# Patient Record
Sex: FEMALE | Race: WHITE | NOT HISPANIC OR LATINO
[De-identification: names, ages, dates, MRNs, and addresses within clinical notes are randomized per-mention and may not be internally consistent; named-entity substitution may affect disease eponyms.]

---

## 2023-11-07 ENCOUNTER — APPOINTMENT (OUTPATIENT)
Dept: ORTHOPEDIC SURGERY | Facility: CLINIC | Age: 78
End: 2023-11-07
Payer: MEDICARE

## 2023-11-07 PROCEDURE — 99213 OFFICE O/P EST LOW 20 MIN: CPT

## 2024-05-21 ENCOUNTER — APPOINTMENT (OUTPATIENT)
Dept: ORTHOPEDIC SURGERY | Facility: CLINIC | Age: 79
End: 2024-05-21
Payer: MEDICARE

## 2024-05-21 PROCEDURE — 20610 DRAIN/INJ JOINT/BURSA W/O US: CPT | Mod: RT

## 2024-05-21 PROCEDURE — 99214 OFFICE O/P EST MOD 30 MIN: CPT | Mod: 25

## 2024-05-21 PROCEDURE — 73564 X-RAY EXAM KNEE 4 OR MORE: CPT | Mod: RT

## 2024-05-21 NOTE — DISCUSSION/SUMMARY
[de-identified] : Lengthy discussion regarding options was had with the patient. Nonsurgical options including but not limited to cortisone, Visco supplementation, Prescription anti-inflammatory medications (both steroidal and non-steroidal), activity modification, non-impact exercise, maintaining a healthy BMI, bracing, and icing were reviewed. Surgical options including but not limited to arthroscopy, and joint replacement were discussed as was risks, benefits and alternatives. The patient was advised of the diagnosis. The natural history of the pathology was explained in full to the patient in layman's terms. Several different treatment options were discussed and explained in full to the patient including the risks and benefits of both surgical and non-surgical treatments. All questions and concerns were answered  Plan at this time is to begin PT and start the gel injection series. f/u in 1 week

## 2024-05-21 NOTE — PHYSICAL EXAM
[NL (0)] : extension 0 degrees [] : patient ambulates without assistive device [Right] : right knee [All Views] : anteroposterior, lateral, skyline, and anteroposterior standing [FreeTextEntry8] : ALEXIAL>GUCCIL [de-identified] : patella tracks laterally [FreeTextEntry9] : Lateral joint space narrowing. Sclerosis of the lateral knee. Valgus deformity. Severe DJD lateral compartment. Tricompartmental osteophyte formation. No fractures seen. [TWNoteComboBox7] : flexion 120 degrees

## 2024-05-21 NOTE — PROCEDURE
[Large Joint Injection] : Large joint injection [Right] : of the right [Knee] : knee [Pain] : pain [Inflammation] : inflammation [X-ray evidence of Osteoarthritis on this or prior visit] : x-ray evidence of Osteoarthritis on this or prior visit [Repeat series performed] : repeat series performed [Betadine] : betadine [Ethyl Chloride sprayed topically] : ethyl chloride sprayed topically [Sterile technique used] : sterile technique used [Gel-Syn (16.8mg)] : 16.8mg of Gel-Syn [#1] : series #1 [] : Patient tolerated procedure well [Call if redness, pain or fever occur] : call if redness, pain or fever occur [Apply ice for 15min out of every hour for the next 12-24 hours as tolerated] : apply ice for 15 minutes out of every hour for the next 12-24 hours as tolerated [Previous OTC use and PT nontherapeutic] : patient has tried OTC's including aspirin, Ibuprofen, Aleve, etc or prescription NSAIDS, and/or exercises at home and/or physical therapy without satisfactory response [Patient had decreased mobility in the joint] : patient had decreased mobility in the joint [Risks, benefits, alternatives discussed / Verbal consent obtained] : the risks benefits, and alternatives have been discussed, and verbal consent was obtained

## 2024-05-21 NOTE — REASON FOR VISIT
[FreeTextEntry2] : here for right knee pain x a few weeks. last visit in 11/2023 was feeling better.  c/o lateral sided pain and pain below knee cap anterior.  patient is doing vishnu classes. no pain meds.  pain is twinges of pain 4/10.  waking her at night.

## 2024-05-31 ENCOUNTER — APPOINTMENT (OUTPATIENT)
Dept: ORTHOPEDIC SURGERY | Facility: CLINIC | Age: 79
End: 2024-05-31
Payer: MEDICARE

## 2024-05-31 PROCEDURE — 20610 DRAIN/INJ JOINT/BURSA W/O US: CPT | Mod: RT

## 2024-05-31 NOTE — PHYSICAL EXAM
[NL (0)] : extension 0 degrees [] : patient ambulates without assistive device [Right] : right knee [All Views] : anteroposterior, lateral, skyline, and anteroposterior standing [FreeTextEntry8] : ALEXIAL>GUCCIL [de-identified] : patella tracks laterally [FreeTextEntry9] : Lateral joint space narrowing. Sclerosis of the lateral knee. Valgus deformity. Severe DJD lateral compartment. Tricompartmental osteophyte formation. No fractures seen. [TWNoteComboBox7] : flexion 120 degrees

## 2024-06-07 ENCOUNTER — APPOINTMENT (OUTPATIENT)
Dept: ORTHOPEDIC SURGERY | Facility: CLINIC | Age: 79
End: 2024-06-07
Payer: MEDICARE

## 2024-06-07 DIAGNOSIS — M17.11 UNILATERAL PRIMARY OSTEOARTHRITIS, RIGHT KNEE: ICD-10-CM

## 2024-06-07 PROCEDURE — 20610 DRAIN/INJ JOINT/BURSA W/O US: CPT | Mod: RT

## 2024-06-07 NOTE — PHYSICAL EXAM
[NL (0)] : extension 0 degrees [] : patient ambulates without assistive device [Right] : right knee [All Views] : anteroposterior, lateral, skyline, and anteroposterior standing [FreeTextEntry8] : ALEXIAL>GUCCIL [de-identified] : patella tracks laterally [FreeTextEntry9] : Lateral joint space narrowing. Sclerosis of the lateral knee. Valgus deformity. Severe DJD lateral compartment. Tricompartmental osteophyte formation. No fractures seen. [TWNoteComboBox7] : flexion 120 degrees

## 2024-08-12 ENCOUNTER — OFFICE (OUTPATIENT)
Dept: URBAN - METROPOLITAN AREA CLINIC 104 | Facility: CLINIC | Age: 79
Setting detail: OPHTHALMOLOGY
End: 2024-08-12
Payer: MEDICARE

## 2024-08-12 ENCOUNTER — RX ONLY (RX ONLY)
Age: 79
End: 2024-08-12

## 2024-08-12 DIAGNOSIS — G43.809: ICD-10-CM

## 2024-08-12 DIAGNOSIS — H25.13: ICD-10-CM

## 2024-08-12 DIAGNOSIS — H43.811: ICD-10-CM

## 2024-08-12 DIAGNOSIS — H52.4: ICD-10-CM

## 2024-08-12 DIAGNOSIS — H11.153: ICD-10-CM

## 2024-08-12 PROCEDURE — 92015 DETERMINE REFRACTIVE STATE: CPT | Performed by: OPHTHALMOLOGY

## 2024-08-12 PROCEDURE — 92004 COMPRE OPH EXAM NEW PT 1/>: CPT | Performed by: OPHTHALMOLOGY

## 2024-08-12 ASSESSMENT — CONFRONTATIONAL VISUAL FIELD TEST (CVF)
OS_FINDINGS: FULL
OD_FINDINGS: FULL

## 2024-10-03 ENCOUNTER — OFFICE (OUTPATIENT)
Dept: URBAN - METROPOLITAN AREA CLINIC 105 | Facility: CLINIC | Age: 79
Setting detail: OPHTHALMOLOGY
End: 2024-10-03
Payer: MEDICARE

## 2024-10-03 DIAGNOSIS — H25.13: ICD-10-CM

## 2024-10-03 DIAGNOSIS — H11.153: ICD-10-CM

## 2024-10-03 DIAGNOSIS — H00.11: ICD-10-CM

## 2024-10-03 PROCEDURE — 92012 INTRM OPH EXAM EST PATIENT: CPT | Performed by: OPHTHALMOLOGY

## 2024-10-03 ASSESSMENT — REFRACTION_CURRENTRX
OD_AXIS: 108
OD_SPHERE: -1.25
OD_AXIS: 116
OS_AXIS: 049
OS_SPHERE: -1.50
OD_SPHERE: -1.25
OS_SPHERE: -1.50
OS_AXIS: 044
OS_OVR_VA: 20/
OS_OVR_VA: 20/
OD_OVR_VA: 20/
OS_CYLINDER: -1.00
OS_CYLINDER: -1.00
OD_CYLINDER: -1.75
OD_OVR_VA: 20/
OD_CYLINDER: -1.75

## 2024-10-03 ASSESSMENT — REFRACTION_MANIFEST
OS_CYLINDER: -1.25
OS_ADD: +2.50
OD_SPHERE: -1.25
OD_VA1: 20/25
OS_AXIS: 050
OD_ADD: +2.50
OD_CYLINDER: -1.75
OS_SPHERE: -3.25
OS_VA1: 20/30
OD_AXIS: 110

## 2024-10-03 ASSESSMENT — REFRACTION_AUTOREFRACTION
OS_AXIS: 048
OS_SPHERE: -2.00
OS_CYLINDER: -2.00
OD_CYLINDER: -1.75
OD_SPHERE: -1.50
OD_AXIS: 126

## 2024-10-03 ASSESSMENT — VISUAL ACUITY
OD_BCVA: 20/30-2
OS_BCVA: 20/25-2

## 2024-10-03 ASSESSMENT — TONOMETRY
OS_IOP_MMHG: 16
OD_IOP_MMHG: 13

## 2024-10-03 ASSESSMENT — DECREASING TEAR LAKE - SEVERITY SCORE
OD_DEC_TEARLAKE: 1+
OS_DEC_TEARLAKE: 1+

## 2024-10-03 ASSESSMENT — CONFRONTATIONAL VISUAL FIELD TEST (CVF)
OS_FINDINGS: FULL
OD_FINDINGS: FULL

## 2024-10-03 ASSESSMENT — KERATOMETRY
OS_K1POWER_DIOPTERS: 41.87
OS_K2POWER_DIOPTERS: 43.16
OD_K1POWER_DIOPTERS: 41.51
OD_AXISANGLE_DEGREES: 033
OS_AXISANGLE_DEGREES: 131
OD_K2POWER_DIOPTERS: 43.21

## 2025-02-03 ENCOUNTER — OFFICE (OUTPATIENT)
Dept: URBAN - METROPOLITAN AREA CLINIC 105 | Facility: CLINIC | Age: 80
Setting detail: OPHTHALMOLOGY
End: 2025-02-03
Payer: MEDICARE

## 2025-02-03 DIAGNOSIS — H11.153: ICD-10-CM

## 2025-02-03 DIAGNOSIS — H25.042: ICD-10-CM

## 2025-02-03 DIAGNOSIS — H25.013: ICD-10-CM

## 2025-02-03 DIAGNOSIS — H35.033: ICD-10-CM

## 2025-02-03 DIAGNOSIS — H25.13: ICD-10-CM

## 2025-02-03 DIAGNOSIS — H43.811: ICD-10-CM

## 2025-02-03 PROCEDURE — 92250 FUNDUS PHOTOGRAPHY W/I&R: CPT | Performed by: OPHTHALMOLOGY

## 2025-02-03 PROCEDURE — 92014 COMPRE OPH EXAM EST PT 1/>: CPT | Performed by: OPHTHALMOLOGY

## 2025-02-03 ASSESSMENT — REFRACTION_MANIFEST
OD_SPHERE: -1.25
OD_VA1: 20/25
OD_AXIS: 110
OS_CYLINDER: -1.25
OS_VA1: 20/30
OS_AXIS: 050
OS_ADD: +2.50
OS_SPHERE: -3.25
OD_CYLINDER: -1.75
OD_ADD: +2.50

## 2025-02-03 ASSESSMENT — REFRACTION_CURRENTRX
OD_OVR_VA: 20/
OD_SPHERE: -1.50
OS_OVR_VA: 20/
OS_SPHERE: -1.50
OD_OVR_VA: 20/
OD_CYLINDER: -1.75
OS_AXIS: 044
OS_AXIS: 044
OD_AXIS: 123
OS_SPHERE: -3.50
OD_CYLINDER: -1.75
OD_AXIS: 108
OS_OVR_VA: 20/
OS_CYLINDER: -1.00
OS_CYLINDER: -1.00
OD_VPRISM_DIRECTION: SV
OD_SPHERE: -1.25
OS_VPRISM_DIRECTION: SV

## 2025-02-03 ASSESSMENT — DECREASING TEAR LAKE - SEVERITY SCORE
OS_DEC_TEARLAKE: 1+
OD_DEC_TEARLAKE: 1+

## 2025-02-03 ASSESSMENT — REFRACTION_AUTOREFRACTION
OS_CYLINDER: -1.50
OS_AXIS: 060
OD_CYLINDER: -2.25
OD_AXIS: 118
OS_SPHERE: -3.50
OD_SPHERE: -1.25

## 2025-02-03 ASSESSMENT — VISUAL ACUITY
OS_BCVA: 20/20
OD_BCVA: 20/30-

## 2025-02-03 ASSESSMENT — CONFRONTATIONAL VISUAL FIELD TEST (CVF)
OS_FINDINGS: FULL
OD_FINDINGS: FULL

## 2025-02-03 ASSESSMENT — KERATOMETRY
OS_AXISANGLE_DEGREES: 134
OS_K2POWER_DIOPTERS: 43.50
OS_K1POWER_DIOPTERS: 41.75
OD_K1POWER_DIOPTERS: 41.50
OD_AXISANGLE_DEGREES: 041
OD_K2POWER_DIOPTERS: 43.50

## 2025-02-03 ASSESSMENT — TONOMETRY
OD_IOP_MMHG: 18
OS_IOP_MMHG: 18

## 2025-03-17 ENCOUNTER — OFFICE (OUTPATIENT)
Dept: URBAN - METROPOLITAN AREA CLINIC 105 | Facility: CLINIC | Age: 80
Setting detail: OPHTHALMOLOGY
End: 2025-03-17
Payer: MEDICARE

## 2025-03-17 DIAGNOSIS — H25.12: ICD-10-CM

## 2025-03-17 DIAGNOSIS — H25.13: ICD-10-CM

## 2025-03-17 PROBLEM — H25.11 CATARACT NUCLEAR SCLEROSIS AGE RELATED; RIGHT EYE, LEFT EYE, BOTH EYES: Status: ACTIVE | Noted: 2025-03-17

## 2025-03-17 PROCEDURE — 99213 OFFICE O/P EST LOW 20 MIN: CPT | Performed by: OPHTHALMOLOGY

## 2025-03-17 PROCEDURE — 92136 OPHTHALMIC BIOMETRY: CPT | Mod: TC | Performed by: OPHTHALMOLOGY

## 2025-03-17 PROCEDURE — 92136 OPHTHALMIC BIOMETRY: CPT | Mod: 26,LT | Performed by: OPHTHALMOLOGY

## 2025-03-17 ASSESSMENT — REFRACTION_MANIFEST
OD_VA1: 20/25
OD_AXIS: 110
OS_VA1: 20/30
OS_ADD: +2.50
OD_SPHERE: -1.25
OS_CYLINDER: -1.25
OD_CYLINDER: -1.75
OS_AXIS: 050
OS_SPHERE: -3.25
OD_ADD: +2.50

## 2025-03-17 ASSESSMENT — TONOMETRY
OD_IOP_MMHG: 17
OS_IOP_MMHG: 18

## 2025-03-17 ASSESSMENT — KERATOMETRY
OD_AXISANGLE_DEGREES: 394
OD_CYLPOWER_DEGREES: 38.01
OD_CYLAXISANGLE_DEGREES: 394
OD_K1POWER_DIOPTERS: 41.51
OD_AXISANGLE2_DEGREES: 394
OD_K1POWER_DIOPTERS: 41.50
OD_K2POWER_DIOPTERS: 43.50
OS_CYLPOWER_DEGREES: 41.5
OS_K1POWER_DIOPTERS: 42.00
OS_K2POWER_DIOPTERS: 43.50
OS_K1POWER_DIOPTERS: 2.00
OS_AXISANGLE2_DEGREES: 133
OS_K2POWER_DIOPTERS: 43.50
OS_AXISANGLE_DEGREES: 133
OS_AXISANGLE_DEGREES: 133
OD_AXISANGLE_DEGREES: 039
OD_K1K2_AVERAGE: 22.5
OD_K2POWER_DIOPTERS: 3.50
OS_CYLAXISANGLE_DEGREES: 133
OS_K1K2_AVERAGE: 22.75

## 2025-03-17 ASSESSMENT — REFRACTION_CURRENTRX
OD_VPRISM_DIRECTION: SV
OD_SPHERE: -1.25
OD_CYLINDER: -1.75
OD_OVR_VA: 20/
OD_AXIS: 108
OD_SPHERE: -1.50
OD_AXIS: 123
OS_CYLINDER: -1.00
OS_VPRISM_DIRECTION: SV
OD_OVR_VA: 20/
OD_CYLINDER: -1.75
OS_AXIS: 044
OS_OVR_VA: 20/
OS_OVR_VA: 20/
OS_SPHERE: -1.50
OS_SPHERE: -3.50
OS_AXIS: 044
OS_CYLINDER: -1.00

## 2025-03-17 ASSESSMENT — VISUAL ACUITY
OS_BCVA: 20/40
OD_BCVA: 20/50

## 2025-03-17 ASSESSMENT — DECREASING TEAR LAKE - SEVERITY SCORE
OS_DEC_TEARLAKE: 1+
OD_DEC_TEARLAKE: 1+

## 2025-03-17 ASSESSMENT — REFRACTION_AUTOREFRACTION
OD_AXIS: 117
OS_SPHERE: -3.75
OD_SPHERE: -1.25
OS_CYLINDER: -1.25
OS_AXIS: 057
OD_CYLINDER: -2.00

## 2025-03-17 ASSESSMENT — CONFRONTATIONAL VISUAL FIELD TEST (CVF)
OD_FINDINGS: FULL
OS_FINDINGS: FULL

## 2025-04-08 ENCOUNTER — OFFICE (OUTPATIENT)
Dept: URBAN - METROPOLITAN AREA CLINIC 113 | Facility: CLINIC | Age: 80
Setting detail: OPHTHALMOLOGY
End: 2025-04-08
Payer: MEDICARE

## 2025-04-08 DIAGNOSIS — H25.13: ICD-10-CM

## 2025-04-08 DIAGNOSIS — Z01.818: ICD-10-CM

## 2025-04-08 PROCEDURE — 99212 OFFICE O/P EST SF 10 MIN: CPT

## 2025-04-17 ENCOUNTER — AMBULATORY SURGERY CENTER (OUTPATIENT)
Dept: URBAN - METROPOLITAN AREA SURGERY 4 | Facility: SURGERY | Age: 80
Setting detail: OPHTHALMOLOGY
End: 2025-04-17
Payer: MEDICARE

## 2025-04-17 DIAGNOSIS — H52.222: ICD-10-CM

## 2025-04-17 DIAGNOSIS — H25.12: ICD-10-CM

## 2025-04-17 PROCEDURE — 66984 XCAPSL CTRC RMVL W/O ECP: CPT | Mod: LT | Performed by: OPHTHALMOLOGY

## 2025-04-17 PROCEDURE — 68841 INSJ RX ELUT IMPLT LAC CANAL: CPT | Mod: LT | Performed by: OPHTHALMOLOGY

## 2025-04-17 PROCEDURE — S9986 NOT MEDICALLY NECESSARY SVC: HCPCS | Mod: GX,GY | Performed by: OPHTHALMOLOGY

## 2025-04-17 PROCEDURE — V2788P PANOPTIX: Performed by: OPHTHALMOLOGY

## 2025-04-18 ENCOUNTER — OFFICE (OUTPATIENT)
Dept: URBAN - METROPOLITAN AREA CLINIC 105 | Facility: CLINIC | Age: 80
Setting detail: OPHTHALMOLOGY
End: 2025-04-18
Payer: MEDICARE

## 2025-04-18 ENCOUNTER — RX ONLY (RX ONLY)
Age: 80
End: 2025-04-18

## 2025-04-18 DIAGNOSIS — Z96.1: ICD-10-CM

## 2025-04-18 PROCEDURE — 99024 POSTOP FOLLOW-UP VISIT: CPT | Performed by: OPHTHALMOLOGY

## 2025-04-18 ASSESSMENT — CORNEAL EDEMA CLINICAL DESCRIPTION: OS_CORNEALEDEMA: 1+

## 2025-04-18 ASSESSMENT — KERATOMETRY
OS_K1POWER_DIOPTERS: 42.00
OS_K2POWER_DIOPTERS: 43.75
OD_K2POWER_DIOPTERS: 43.75
OS_AXISANGLE_DEGREES: 129
OD_K1POWER_DIOPTERS: 41.75
OD_AXISANGLE_DEGREES: 043

## 2025-04-18 ASSESSMENT — REFRACTION_AUTOREFRACTION
OD_CYLINDER: -2.00
OD_AXIS: 122
OS_CYLINDER: -1.00
OD_SPHERE: -1.25
OS_AXIS: 063
OS_SPHERE: -0.25

## 2025-04-18 ASSESSMENT — VISUAL ACUITY
OD_BCVA: 20/50-2
OS_BCVA: 20/40

## 2025-04-18 ASSESSMENT — DECREASING TEAR LAKE - SEVERITY SCORE
OD_DEC_TEARLAKE: 1+
OS_DEC_TEARLAKE: 1+

## 2025-04-18 ASSESSMENT — TONOMETRY
OS_IOP_MMHG: 18
OD_IOP_MMHG: 17

## 2025-04-18 ASSESSMENT — CONFRONTATIONAL VISUAL FIELD TEST (CVF)
OD_FINDINGS: FULL
OS_FINDINGS: FULL

## 2025-04-25 ENCOUNTER — OFFICE (OUTPATIENT)
Dept: URBAN - METROPOLITAN AREA CLINIC 103 | Facility: CLINIC | Age: 80
Setting detail: OPHTHALMOLOGY
End: 2025-04-25
Payer: MEDICARE

## 2025-04-25 DIAGNOSIS — H25.11: ICD-10-CM

## 2025-04-25 PROCEDURE — 92136 OPHTHALMIC BIOMETRY: CPT | Mod: 26,RT | Performed by: OPHTHALMOLOGY

## 2025-04-25 ASSESSMENT — KERATOMETRY
OD_K1POWER_DIOPTERS: 41.75
OS_AXISANGLE_DEGREES: 124
OS_K2POWER_DIOPTERS: 43.50
OD_K2POWER_DIOPTERS: 43.50
OD_AXISANGLE_DEGREES: 037
OS_K1POWER_DIOPTERS: 42.00

## 2025-04-25 ASSESSMENT — REFRACTION_AUTOREFRACTION
OD_SPHERE: -1.00
OD_CYLINDER: -2.00
OS_SPHERE: PL
OS_CYLINDER: -0.75
OD_AXIS: 117
OS_AXIS: 036

## 2025-04-25 ASSESSMENT — VISUAL ACUITY
OD_BCVA: 20/30-1
OS_BCVA: 20/40

## 2025-04-25 ASSESSMENT — DECREASING TEAR LAKE - SEVERITY SCORE
OS_DEC_TEARLAKE: 1+
OD_DEC_TEARLAKE: 1+

## 2025-04-25 ASSESSMENT — CONFRONTATIONAL VISUAL FIELD TEST (CVF)
OD_FINDINGS: FULL
OS_FINDINGS: FULL

## 2025-04-25 ASSESSMENT — TONOMETRY
OD_IOP_MMHG: 14
OS_IOP_MMHG: 12

## 2025-05-01 ENCOUNTER — AMBULATORY SURGERY CENTER (OUTPATIENT)
Dept: URBAN - METROPOLITAN AREA SURGERY 4 | Facility: SURGERY | Age: 80
Setting detail: OPHTHALMOLOGY
End: 2025-05-01
Payer: MEDICARE

## 2025-05-01 DIAGNOSIS — H25.11: ICD-10-CM

## 2025-05-01 DIAGNOSIS — H52.4: ICD-10-CM

## 2025-05-01 PROCEDURE — 66984 XCAPSL CTRC RMVL W/O ECP: CPT | Mod: 79,RT | Performed by: OPHTHALMOLOGY

## 2025-05-01 PROCEDURE — 68841 INSJ RX ELUT IMPLT LAC CANAL: CPT | Mod: 79,RT | Performed by: OPHTHALMOLOGY

## 2025-05-01 PROCEDURE — V2788 PRESBYOPIA-CORRECT FUNCTION: HCPCS | Performed by: OPHTHALMOLOGY

## 2025-05-01 PROCEDURE — S9986 NOT MEDICALLY NECESSARY SVC: HCPCS | Mod: GX,GY | Performed by: OPHTHALMOLOGY

## 2025-05-02 ENCOUNTER — OFFICE (OUTPATIENT)
Dept: URBAN - METROPOLITAN AREA CLINIC 103 | Facility: CLINIC | Age: 80
Setting detail: OPHTHALMOLOGY
End: 2025-05-02
Payer: MEDICARE

## 2025-05-02 DIAGNOSIS — Z96.1: ICD-10-CM

## 2025-05-02 PROBLEM — Z01.818 ENCOUNTER FOR OTHER PREPROCEDURAL EXAMINATION: Status: ACTIVE | Noted: 2025-04-08

## 2025-05-02 PROBLEM — H25.011 CORTICAL CATARACT;  , RIGHT EYE: Status: RESOLVED | Noted: 2025-04-18 | Resolved: 2025-05-02

## 2025-05-02 PROBLEM — H18.413 ARCUS SENILIS; BOTH EYES: Status: ACTIVE | Noted: 2025-04-18

## 2025-05-02 PROCEDURE — 99024 POSTOP FOLLOW-UP VISIT: CPT

## 2025-05-02 ASSESSMENT — REFRACTION_AUTOREFRACTION
OD_CYLINDER: -1.25
OS_SPHERE: -0.25
OD_SPHERE: PL
OS_AXIS: 024
OD_AXIS: 126
OS_CYLINDER: -0.75

## 2025-05-02 ASSESSMENT — CONFRONTATIONAL VISUAL FIELD TEST (CVF)
OS_FINDINGS: FULL
OD_FINDINGS: FULL

## 2025-05-02 ASSESSMENT — DECREASING TEAR LAKE - SEVERITY SCORE
OD_DEC_TEARLAKE: 1+
OS_DEC_TEARLAKE: 1+

## 2025-05-02 ASSESSMENT — KERATOMETRY
OS_K2POWER_DIOPTERS: 44.25
OD_K1POWER_DIOPTERS: 42.50
OD_K2POWER_DIOPTERS: 44.00
OS_AXISANGLE_DEGREES: 120
OD_AXISANGLE_DEGREES: 043
OS_K1POWER_DIOPTERS: 42.75

## 2025-05-02 ASSESSMENT — VISUAL ACUITY
OS_BCVA: 20/40-1
OD_BCVA: 20/40-1

## 2025-05-02 ASSESSMENT — TONOMETRY
OS_IOP_MMHG: 13
OD_IOP_MMHG: 14

## 2025-05-02 ASSESSMENT — CORNEAL EDEMA CLINICAL DESCRIPTION: OD_CORNEALEDEMA: T

## 2025-05-30 ENCOUNTER — OFFICE (OUTPATIENT)
Dept: URBAN - METROPOLITAN AREA CLINIC 105 | Facility: CLINIC | Age: 80
Setting detail: OPHTHALMOLOGY
End: 2025-05-30
Payer: MEDICARE

## 2025-05-30 DIAGNOSIS — Z96.1: ICD-10-CM

## 2025-05-30 PROBLEM — H26.492 POSTERIOR CAPSULAR OPACIFICATION; LEFT EYE: Status: ACTIVE | Noted: 2025-05-02

## 2025-05-30 PROCEDURE — 99024 POSTOP FOLLOW-UP VISIT: CPT | Performed by: OPHTHALMOLOGY

## 2025-05-30 ASSESSMENT — KERATOMETRY
OD_K2POWER_DIOPTERS: 43.50
OS_K2POWER_DIOPTERS: 43.50
OS_K1POWER_DIOPTERS: 42.25
OS_AXISANGLE_DEGREES: 129
OD_K1POWER_DIOPTERS: 41.75
OD_AXISANGLE_DEGREES: 040

## 2025-05-30 ASSESSMENT — CONFRONTATIONAL VISUAL FIELD TEST (CVF)
OS_FINDINGS: FULL
OD_FINDINGS: FULL

## 2025-05-30 ASSESSMENT — VISUAL ACUITY
OD_BCVA: 20/40-
OS_BCVA: 20/25

## 2025-05-30 ASSESSMENT — REFRACTION_AUTOREFRACTION
OS_AXIS: 057
OS_SPHERE: -0.25
OD_SPHERE: PL
OD_AXIS: 105
OD_CYLINDER: -1.00
OS_CYLINDER: -0.50

## 2025-05-30 ASSESSMENT — TONOMETRY
OS_IOP_MMHG: 17
OD_IOP_MMHG: 16